# Patient Record
Sex: FEMALE | Race: WHITE | Employment: OTHER | URBAN - METROPOLITAN AREA
[De-identification: names, ages, dates, MRNs, and addresses within clinical notes are randomized per-mention and may not be internally consistent; named-entity substitution may affect disease eponyms.]

---

## 2019-04-29 ENCOUNTER — APPOINTMENT (OUTPATIENT)
Dept: GENERAL RADIOLOGY | Facility: HOSPITAL | Age: 74
End: 2019-04-29
Attending: EMERGENCY MEDICINE
Payer: MEDICARE

## 2019-04-29 ENCOUNTER — APPOINTMENT (OUTPATIENT)
Dept: CV DIAGNOSTICS | Facility: HOSPITAL | Age: 74
End: 2019-04-29
Attending: HOSPITALIST
Payer: MEDICARE

## 2019-04-29 ENCOUNTER — HOSPITAL ENCOUNTER (OUTPATIENT)
Facility: HOSPITAL | Age: 74
Setting detail: OBSERVATION
Discharge: HOME | End: 2019-04-30
Attending: EMERGENCY MEDICINE | Admitting: HOSPITALIST
Payer: MEDICARE

## 2019-04-29 ENCOUNTER — APPOINTMENT (OUTPATIENT)
Dept: CT IMAGING | Facility: HOSPITAL | Age: 74
End: 2019-04-29
Attending: EMERGENCY MEDICINE
Payer: MEDICARE

## 2019-04-29 DIAGNOSIS — R55 SYNCOPE AND COLLAPSE: Primary | ICD-10-CM

## 2019-04-29 PROCEDURE — 73030 X-RAY EXAM OF SHOULDER: CPT | Performed by: EMERGENCY MEDICINE

## 2019-04-29 PROCEDURE — 70450 CT HEAD/BRAIN W/O DYE: CPT | Performed by: EMERGENCY MEDICINE

## 2019-04-29 PROCEDURE — 93306 TTE W/DOPPLER COMPLETE: CPT | Performed by: HOSPITALIST

## 2019-04-29 PROCEDURE — 71045 X-RAY EXAM CHEST 1 VIEW: CPT | Performed by: EMERGENCY MEDICINE

## 2019-04-29 PROCEDURE — 99220 INITIAL OBSERVATION CARE,LEVL III: CPT | Performed by: HOSPITALIST

## 2019-04-29 RX ORDER — METOPROLOL TARTRATE 50 MG/1
50 TABLET, FILM COATED ORAL ONCE AS NEEDED
Status: ACTIVE | OUTPATIENT
Start: 2019-04-29 | End: 2019-04-29

## 2019-04-29 RX ORDER — ASPIRIN 325 MG
325 TABLET ORAL DAILY
Status: DISCONTINUED | OUTPATIENT
Start: 2019-04-29 | End: 2019-04-30

## 2019-04-29 RX ORDER — CLONAZEPAM 0.5 MG/1
0.5 TABLET ORAL NIGHTLY PRN
Status: DISCONTINUED | OUTPATIENT
Start: 2019-04-29 | End: 2019-04-30

## 2019-04-29 RX ORDER — METOPROLOL TARTRATE 50 MG/1
50 TABLET, FILM COATED ORAL ONCE
Status: DISCONTINUED | OUTPATIENT
Start: 2019-04-30 | End: 2019-04-30

## 2019-04-29 RX ORDER — METOPROLOL TARTRATE 100 MG/1
100 TABLET ORAL ONCE
Status: DISCONTINUED | OUTPATIENT
Start: 2019-04-30 | End: 2019-04-30

## 2019-04-29 RX ORDER — SODIUM CHLORIDE 0.9 % (FLUSH) 0.9 %
3 SYRINGE (ML) INJECTION AS NEEDED
Status: DISCONTINUED | OUTPATIENT
Start: 2019-04-29 | End: 2019-04-30

## 2019-04-29 RX ORDER — ATORVASTATIN CALCIUM 40 MG/1
80 TABLET, FILM COATED ORAL NIGHTLY
Status: DISCONTINUED | OUTPATIENT
Start: 2019-04-29 | End: 2019-04-30

## 2019-04-29 RX ORDER — DILTIAZEM HYDROCHLORIDE 5 MG/ML
10 INJECTION INTRAVENOUS SEE ADMIN INSTRUCTIONS
Status: DISCONTINUED | OUTPATIENT
Start: 2019-04-29 | End: 2019-04-30

## 2019-04-29 RX ORDER — METOPROLOL TARTRATE 100 MG/1
100 TABLET ORAL ONCE AS NEEDED
Status: DISCONTINUED | OUTPATIENT
Start: 2019-04-29 | End: 2019-04-30

## 2019-04-29 RX ORDER — CARVEDILOL 6.25 MG/1
6.25 TABLET ORAL 2 TIMES DAILY WITH MEALS
Status: DISCONTINUED | OUTPATIENT
Start: 2019-04-29 | End: 2019-04-30

## 2019-04-29 RX ORDER — METOPROLOL TARTRATE 50 MG/1
50 TABLET, FILM COATED ORAL ONCE
Status: COMPLETED | OUTPATIENT
Start: 2019-04-29 | End: 2019-04-29

## 2019-04-29 RX ORDER — METOPROLOL TARTRATE 100 MG/1
100 TABLET ORAL ONCE
Status: COMPLETED | OUTPATIENT
Start: 2019-04-29 | End: 2019-04-29

## 2019-04-29 RX ORDER — NITROGLYCERIN 0.4 MG/1
0.4 TABLET SUBLINGUAL ONCE
Status: DISCONTINUED | OUTPATIENT
Start: 2019-04-29 | End: 2019-04-30

## 2019-04-29 RX ORDER — METOPROLOL TARTRATE 50 MG/1
50 TABLET, FILM COATED ORAL ONCE AS NEEDED
Status: DISCONTINUED | OUTPATIENT
Start: 2019-04-30 | End: 2019-04-30

## 2019-04-29 RX ORDER — MIRTAZAPINE 15 MG/1
22.5 TABLET, FILM COATED ORAL NIGHTLY
Status: DISCONTINUED | OUTPATIENT
Start: 2019-04-29 | End: 2019-04-30

## 2019-04-29 RX ORDER — GLIMEPIRIDE 1 MG/1
0.5 TABLET ORAL
Status: DISCONTINUED | OUTPATIENT
Start: 2019-04-30 | End: 2019-04-30

## 2019-04-29 RX ORDER — ONDANSETRON 2 MG/ML
4 INJECTION INTRAMUSCULAR; INTRAVENOUS EVERY 6 HOURS PRN
Status: DISCONTINUED | OUTPATIENT
Start: 2019-04-29 | End: 2019-04-30

## 2019-04-29 RX ORDER — HEPARIN SODIUM 5000 [USP'U]/ML
5000 INJECTION, SOLUTION INTRAVENOUS; SUBCUTANEOUS EVERY 12 HOURS SCHEDULED
Status: DISCONTINUED | OUTPATIENT
Start: 2019-04-29 | End: 2019-04-30

## 2019-04-29 RX ORDER — ALPRAZOLAM 0.25 MG/1
0.25 TABLET ORAL ONCE AS NEEDED
Status: DISCONTINUED | OUTPATIENT
Start: 2019-04-30 | End: 2019-04-30

## 2019-04-29 RX ORDER — METOPROLOL TARTRATE 5 MG/5ML
5 INJECTION INTRAVENOUS SEE ADMIN INSTRUCTIONS
Status: DISCONTINUED | OUTPATIENT
Start: 2019-04-29 | End: 2019-04-30

## 2019-04-29 RX ORDER — NITROGLYCERIN 0.4 MG/1
0.4 TABLET SUBLINGUAL EVERY 5 MIN PRN
Status: DISCONTINUED | OUTPATIENT
Start: 2019-04-29 | End: 2019-04-30

## 2019-04-29 RX ORDER — METOPROLOL TARTRATE 100 MG/1
100 TABLET ORAL ONCE AS NEEDED
Status: ACTIVE | OUTPATIENT
Start: 2019-04-29 | End: 2019-04-29

## 2019-04-29 RX ORDER — DEXTROSE MONOHYDRATE 25 G/50ML
50 INJECTION, SOLUTION INTRAVENOUS AS NEEDED
Status: DISCONTINUED | OUTPATIENT
Start: 2019-04-29 | End: 2019-04-30

## 2019-04-29 RX ORDER — METOPROLOL TARTRATE 50 MG/1
50 TABLET, FILM COATED ORAL ONCE AS NEEDED
Status: DISCONTINUED | OUTPATIENT
Start: 2019-04-29 | End: 2019-04-30

## 2019-04-29 NOTE — ED INITIAL ASSESSMENT (HPI)
Pt c/o diarrhea since Friday and today c/o nausea and chills. Pt denies fever, no blood in stool. Pt states she also feel this am while getting out of the bathtub and hit her head. Pt states she does not remember how she fell.   C/o lump to right side

## 2019-04-29 NOTE — H&P
[facility]    PATIENT'S NAME: Lou Brittonbaironfrances PHYSICIAN: Ren Blake MD   PATIENT ACCOUNT#:   [id number]    LOCATION:  [address] Gallo [facility] RECORD #:   [id number]       YOB: 1945  ADMISSION DATE:       04/29/20 against the bathroom tile. She denies any bleed. No chest pain. Other 12-point review of systems negative. PHYSICAL EXAMINATION:    GENERAL:  Alert, oriented to time, place, and person, no acute distress.   VITAL SIGNS:  Temperature 97.6, pulse 78, Orthopedic Surgery upon discharge.     Dictated By Kaity Mosley MD  d: 04/29/2019 15:53:43  t: 04/29/2019 15:57:41  Job [id number]  /

## 2019-04-29 NOTE — ED PROVIDER NOTES
Patient Seen in: [facility] AND [facility] Emergency Department    History   Patient presents with:  Nausea/Vomiting/Diarrhea (gastrointestinal)  Fall (musculoskeletal, neurologic)    Stated Complaint: diarrhea, nausea, weakness    HPI    57-year-old female with Used    Alcohol use: No    Drug use: No      Review of Systems    Positive for stated complaint: diarrhea, nausea, weakness  Other systems are as noted in HPI. Constitutional and vital signs reviewed.       All other systems reviewed and negative except as Ketones Urine 80  (*)     Ascorbic Acid Urine 40  (*)     All other components within normal limits   BASIC METABOLIC PANEL (8) - Abnormal; Notable for the following components:    Glucose 180 (*)     BUN/CREA Ratio 22.5 (*)     All other components within pressure.     Medications Prescribed:  Current Discharge Medication List        Present on Admission           ICD-10-CM Noted POA    Syncope and collapse R55 10/24/2016 Unknown

## 2019-04-29 NOTE — CONSULTS
[facility]    Report of Consultation    Drake Roldan Patient Status:  Emergency    1945 MRN [id number]   Location [address] Attending Anjel Mccormick MD   Hosp Day # 0 PCP CATHIE ANN Rigoberto Leyden Oral, resp. rate 16, height 149.9 cm (4' 11\"), weight 100 lb (45.4 kg), SpO2 98 %. Intake/Output:   Last 3 shifts: No intake/output data recorded. This shift: No intake/output data recorded.      Vent Settings:      Hemodynamic parameters (last 24 hours aortic root was normal in size. Mitral valve:   Normal thickness leaflets. . Mobility was not restricted. Doppler:   There was no evidence for stenosis.    No regurgitation. Left atrium:  The atrium was normal in size.   Right ventricle:  The cavity size

## 2019-04-30 ENCOUNTER — APPOINTMENT (OUTPATIENT)
Dept: NUCLEAR MEDICINE | Facility: HOSPITAL | Age: 74
End: 2019-04-30
Attending: HOSPITALIST
Payer: MEDICARE

## 2019-04-30 ENCOUNTER — APPOINTMENT (OUTPATIENT)
Dept: CT IMAGING | Facility: HOSPITAL | Age: 74
End: 2019-04-30
Attending: HOSPITALIST
Payer: MEDICARE

## 2019-04-30 ENCOUNTER — APPOINTMENT (OUTPATIENT)
Dept: CV DIAGNOSTICS | Facility: HOSPITAL | Age: 74
End: 2019-04-30
Attending: HOSPITALIST
Payer: MEDICARE

## 2019-04-30 VITALS
SYSTOLIC BLOOD PRESSURE: 129 MMHG | WEIGHT: 86.88 LBS | OXYGEN SATURATION: 96 % | TEMPERATURE: 98 F | RESPIRATION RATE: 16 BRPM | DIASTOLIC BLOOD PRESSURE: 47 MMHG | BODY MASS INDEX: 17.52 KG/M2 | HEIGHT: 59 IN | HEART RATE: 55 BPM

## 2019-04-30 PROCEDURE — 93017 CV STRESS TEST TRACING ONLY: CPT | Performed by: HOSPITALIST

## 2019-04-30 PROCEDURE — 78452 HT MUSCLE IMAGE SPECT MULT: CPT | Performed by: HOSPITALIST

## 2019-04-30 PROCEDURE — 99217 OBSERVATION CARE DISCHARGE: CPT | Performed by: HOSPITALIST

## 2019-04-30 PROCEDURE — 75571 CT HRT W/O DYE W/CA TEST: CPT | Performed by: HOSPITALIST

## 2019-04-30 RX ORDER — AMLODIPINE BESYLATE 5 MG/1
5 TABLET ORAL DAILY
Qty: 30 TABLET | Refills: 0 | Status: SHIPPED | OUTPATIENT
Start: 2019-04-30

## 2019-04-30 RX ORDER — ATORVASTATIN CALCIUM 20 MG/1
20 TABLET, FILM COATED ORAL NIGHTLY
Qty: 30 TABLET | Refills: 0 | Status: SHIPPED | OUTPATIENT
Start: 2019-04-30 | End: 2021-10-07

## 2019-04-30 RX ORDER — 0.9 % SODIUM CHLORIDE 0.9 %
VIAL (ML) INJECTION
Status: COMPLETED
Start: 2019-04-30 | End: 2019-04-30

## 2019-04-30 RX ORDER — NITROGLYCERIN 0.4 MG/1
TABLET SUBLINGUAL
Status: COMPLETED
Start: 2019-04-30 | End: 2019-04-30

## 2019-04-30 NOTE — PROGRESS NOTES
Cardiology progress note             Current Medications:    Current Facility-Administered Medications:  Normal Saline Flush 0.9 % injection 3 mL 3 mL Intravenous PRN   ondansetron HCl (ZOFRAN) injection 4 mg 4 mg Intravenous Q6H PRN   Heparin Sodium (Porc 22.5 mg 22.5 mg Oral Nightly   linagliptin (TRADJENTA) tab TABS 5 mg 5 mg Oral Daily       Medications Prior to Admission:  SITagliptin Phosphate 50 MG Oral Tab Take 1 tablet (50 mg total) by mouth daily.    carvedilol 6.25 MG Oral Tab Take 1 tablet (6.25 m Component Value Date    WBC 5.5 04/30/2019    HGB 12.7 04/30/2019    HCT 36.6 04/30/2019    .0 04/30/2019    CREATSERUM 0.75 04/30/2019    BUN 27 (H) 04/30/2019     04/30/2019    K 4.3 04/30/2019     04/30/2019    CO2 29.0 04/30/2019 (CST): Raven Adamson MD on 4/30/2019 at 9:33             Echo 4/29/19  Left ventricle:  The cavity size was normal. There was no hypertrophy. Systolic  function was normal. The estimated ejection fraction was 70%.  Wall motion was  normal; there were no r [id number]  Tel: [phone]

## 2019-04-30 NOTE — BH PROGRESS NOTE
Behavioral Health Note  CHIEF COMPLAINT  Syncope    REASON FOR ADMISSION  Syncope    SOCIAL HISTORY  Latoya lives at [facility] and is . She does not smoke, drink alcohol or use drugs.     PAST PSYCHIATRIC HISTORY  Laverne Linton has a h/o depression patient has a dx r/o depressive disorder. PLAN  1. Patient declines referrals.    Jamie Sandoval LCSW

## 2019-04-30 NOTE — DISCHARGE PLANNING
Patient and son Donnell Kim (with permission) were provided with discharge instructions and education including how to avoid syncope/identify signs of syncope and clavicle fracture.  Patient's prescriptions were sent electronically to her outpatient pharmacy and h

## 2019-04-30 NOTE — IMAGING NOTE
TO CT AT 0825  HX TAKEN PT CONSENTED AT 4/29/19 @ 0822 VS HR 55 /88    18 GAUGE IV STARTED AT 0546 POC TESTING COMPLETED GFR > 60  CREATINE = 0.75    CTA ORDERED BY DR Dana Sow WAS PT GIVEN CTA  PREMEDS  MG @ 8875 4/29/19    Syncopal episode w

## 2019-04-30 NOTE — PLAN OF CARE
Problem: Patient/Family Goals  Goal: Patient/Family Long Term Goal  Description  Patient's Long Term Goal: \"Stay healthy so I stay out of the hospital. I haven't taken any medications for two years\"    Interventions:  - Follow up with MDs as recommende Assess pain using appropriate pain scale  - Administer analgesics based on type and severity of pain and evaluate response  - Implement non-pharmacological measures as appropriate and evaluate response  - Consider cultural and social influences on pain and

## 2019-04-30 NOTE — DISCHARGE SUMMARY
[facility] Hospitalist Discharge Summary   Patient ID:  Joselyn Leisure  [id number]  66year old  1/9/1945    Admit date: 4/29/2019  Discharge date: 4/30/2019  Primary Care Physician: [facility] Lachelle Flor [address] [place] [address]   Attending Physician: Alfredo Tamayo MD ND  EXTREMITIES: no edema, no calf tenderness    Operative Procedures:   Radiology:   Xr Shoulder, Complete (min 2 Views), Right (cpt=73030)    Result Date: 4/29/2019  CONCLUSION:  1. Acute nondisplaced distal right clavicle fracture deformity.  2. Right sh total) by mouth nightly. What changed:    · medication strength  · how much to take        CONTINUE taking these medications    aspirin 81 MG Tabs        STOP taking these medications    carvedilol 6.25 MG Tabs  Commonly known as:  COREG     clonazePAM 0. MD  Hospitalist  4/30/2019

## 2019-08-31 ENCOUNTER — APPOINTMENT (OUTPATIENT)
Dept: LAB | Age: 74
End: 2019-08-31
Attending: INTERNAL MEDICINE
Payer: MEDICARE

## 2019-08-31 ENCOUNTER — LAB ENCOUNTER (OUTPATIENT)
Dept: LAB | Facility: HOSPITAL | Age: 74
End: 2019-08-31
Attending: INTERNAL MEDICINE
Payer: MEDICARE

## 2019-08-31 DIAGNOSIS — I25.10 CORONARY ATHEROSCLEROSIS OF NATIVE CORONARY ARTERY: Primary | ICD-10-CM

## 2019-08-31 LAB
CHOLEST SMN-MCNC: 199 MG/DL (ref ?–200)
HDLC SERPL-MCNC: 86 MG/DL (ref 40–59)
LDLC SERPL CALC-MCNC: 95 MG/DL (ref ?–100)
NONHDLC SERPL-MCNC: 113 MG/DL (ref ?–130)
PATIENT FASTING: YES
TRIGL SERPL-MCNC: 91 MG/DL (ref 30–149)
VLDLC SERPL CALC-MCNC: 18 MG/DL (ref 0–30)

## 2019-08-31 PROCEDURE — 36415 COLL VENOUS BLD VENIPUNCTURE: CPT

## 2019-08-31 PROCEDURE — 80061 LIPID PANEL: CPT

## 2019-09-30 NOTE — PLAN OF CARE
Problem: MUSCULOSKELETAL - ADULT  Goal: Maintain proper alignment of affected body part  Description  INTERVENTIONS:  - Support and protect limb and body alignment per provider's orders  - Instruct and reinforce with patient and family use of appropriate assistive devices as appropriate  - Consider OT/PT consult to assist with strengthening/mobility  - Encourage toileting schedule  Outcome: Progressing     Problem: CARDIOVASCULAR - ADULT  Goal: Maintains optimal cardiac output and hemodynamic stability  Anton Hdz 2019

## 2020-02-29 ENCOUNTER — LAB ENCOUNTER (OUTPATIENT)
Dept: LAB | Age: 75
End: 2020-02-29
Attending: INTERNAL MEDICINE
Payer: MEDICARE

## 2020-02-29 DIAGNOSIS — I25.10 CORONARY ATHEROSCLEROSIS OF NATIVE CORONARY ARTERY: Primary | ICD-10-CM

## 2020-02-29 LAB
CHOLEST SMN-MCNC: 261 MG/DL (ref ?–200)
HDLC SERPL-MCNC: 82 MG/DL (ref 40–59)
LDLC SERPL CALC-MCNC: 159 MG/DL (ref ?–100)
NONHDLC SERPL-MCNC: 179 MG/DL (ref ?–130)
PATIENT FASTING Y/N/NP: YES
TRIGL SERPL-MCNC: 102 MG/DL (ref 30–149)
VLDLC SERPL CALC-MCNC: 20 MG/DL (ref 0–30)

## 2020-02-29 PROCEDURE — 80061 LIPID PANEL: CPT

## 2020-02-29 PROCEDURE — 36415 COLL VENOUS BLD VENIPUNCTURE: CPT

## 2021-10-04 ENCOUNTER — HOSPITAL ENCOUNTER (OUTPATIENT)
Dept: ULTRASOUND IMAGING | Facility: HOSPITAL | Age: 76
Discharge: HOME | End: 2021-10-04
Attending: INTERNAL MEDICINE
Payer: MEDICARE

## 2021-10-04 DIAGNOSIS — E04.2 MULTIPLE THYROID NODULES: ICD-10-CM

## 2021-10-04 PROCEDURE — 76536 US EXAM OF HEAD AND NECK: CPT | Performed by: INTERNAL MEDICINE

## 2021-10-05 PROBLEM — I25.10 CORONARY ARTERY DISEASE: Status: ACTIVE | Noted: 2021-10-05

## 2021-10-05 PROBLEM — I65.29 CAROTID ARTERY STENOSIS: Status: ACTIVE | Noted: 2021-10-05

## 2021-10-05 PROBLEM — I10 HYPERTENSION: Status: ACTIVE | Noted: 2019-05-07

## 2021-10-05 PROBLEM — M85.80 OSTEOPENIA: Status: ACTIVE | Noted: 2021-10-05

## 2021-10-05 PROBLEM — K21.9 GERD (GASTROESOPHAGEAL REFLUX DISEASE): Status: ACTIVE | Noted: 2021-10-05

## 2021-10-05 PROBLEM — E78.5 DYSLIPIDEMIA: Status: ACTIVE | Noted: 2019-05-07

## 2021-10-05 PROBLEM — E78.5 HYPERLIPIDEMIA: Status: ACTIVE | Noted: 2019-05-07

## 2021-10-05 NOTE — PROGRESS NOTES
[facility] NOTE    HPI  Bjiu Montalvo is a 64year old female presenting for     #Abnormal thyroid ultrasound  #Multinodular thyroid  -US thyroid 10/4/21 - Heterogeneous thyroid consistent with diffuse thyroid disease.  Multinodular thyroid ALLERGIES    Ciprofloxacin               MEDICATIONS  Current Outpatient Medications   Medication Sig Dispense Refill   • rosuvastatin 40 MG Oral Tab Take 40 mg by mouth daily.      • amLODIPine Besylate 5 MG Oral Tab Take 1 tablet (5 mg total) by adrian Occupational History      Not on file    Tobacco Use      Smoking status: Never Smoker      Smokeless tobacco: Never Used    Substance and Sexual Activity      Alcohol use: No      Drug use: No      Sexual activity: Not on file    Other Topics      Concern REMOVAL OF OVARIAN CYST(S)         PAST FAMILY HISTORY  No family history on file. PHYSICAL EXAM   10/07/21  1535   BP: 146/62   Pulse: 93   SpO2: 97%   Weight: 92 lb (41.7 kg)   Height: 4' 11\" (1.499 m)      Body mass index is 18.58 kg/m².     GENERA well-controlled on amlodipine 5 mg daily. Typically follows with cardiology as well. Type 2 diabetes mellitus with diabetic peripheral angiopathy without gangrene [facility])     Patient with history of diabetes, now currently diet controlled.   I will c

## 2021-10-07 ENCOUNTER — OFFICE VISIT (OUTPATIENT)
Dept: INTERNAL MEDICINE CLINIC | Facility: CLINIC | Age: 76
End: 2021-10-07
Payer: MEDICARE

## 2021-10-07 VITALS
WEIGHT: 92 LBS | SYSTOLIC BLOOD PRESSURE: 146 MMHG | HEIGHT: 59 IN | BODY MASS INDEX: 18.55 KG/M2 | HEART RATE: 93 BPM | OXYGEN SATURATION: 97 % | DIASTOLIC BLOOD PRESSURE: 62 MMHG

## 2021-10-07 DIAGNOSIS — E04.2 MULTINODULAR THYROID: Primary | ICD-10-CM

## 2021-10-07 DIAGNOSIS — Z00.00 HEALTH MAINTENANCE EXAMINATION: ICD-10-CM

## 2021-10-07 DIAGNOSIS — I65.29 STENOSIS OF CAROTID ARTERY, UNSPECIFIED LATERALITY: ICD-10-CM

## 2021-10-07 DIAGNOSIS — I10 HYPERTENSION, UNSPECIFIED TYPE: ICD-10-CM

## 2021-10-07 DIAGNOSIS — E11.51 TYPE 2 DIABETES MELLITUS WITH DIABETIC PERIPHERAL ANGIOPATHY WITHOUT GANGRENE, UNSPECIFIED WHETHER LONG TERM INSULIN USE (HCC): ICD-10-CM

## 2021-10-07 DIAGNOSIS — E78.5 DYSLIPIDEMIA: ICD-10-CM

## 2021-10-07 PROCEDURE — 83036 HEMOGLOBIN GLYCOSYLATED A1C: CPT | Performed by: FAMILY MEDICINE

## 2021-10-07 PROCEDURE — 86803 HEPATITIS C AB TEST: CPT | Performed by: FAMILY MEDICINE

## 2021-10-07 PROCEDURE — 84443 ASSAY THYROID STIM HORMONE: CPT | Performed by: FAMILY MEDICINE

## 2021-10-07 PROCEDURE — 82043 UR ALBUMIN QUANTITATIVE: CPT | Performed by: FAMILY MEDICINE

## 2021-10-07 PROCEDURE — 80053 COMPREHEN METABOLIC PANEL: CPT | Performed by: FAMILY MEDICINE

## 2021-10-07 PROCEDURE — 82570 ASSAY OF URINE CREATININE: CPT | Performed by: FAMILY MEDICINE

## 2021-10-07 PROCEDURE — 85025 COMPLETE CBC W/AUTO DIFF WBC: CPT | Performed by: FAMILY MEDICINE

## 2021-10-07 PROCEDURE — 80061 LIPID PANEL: CPT | Performed by: FAMILY MEDICINE

## 2021-10-07 PROCEDURE — 99204 OFFICE O/P NEW MOD 45 MIN: CPT | Performed by: FAMILY MEDICINE

## 2021-10-07 RX ORDER — ROSUVASTATIN CALCIUM 40 MG/1
40 TABLET, COATED ORAL DAILY
COMMUNITY
Start: 2021-05-24

## 2021-10-07 NOTE — PATIENT INSTRUCTIONS
PATIENT INSTRUCTIONS    • Thank you for seeing me today, it was a pleasure taking care of you. • Please check out at the  and schedule a follow up appointment. Return in about 1 week (around 10/14/2021) for medicare wellness visit.   • Please ge 12/1/2019  © 2015-6288 [employer]. All rights reserved. This information is not intended as a substitute for professional medical care. Always follow your healthcare professional's instructions.

## 2021-10-07 NOTE — ASSESSMENT & PLAN NOTE
Patient with multinodular thyroid seen on ultrasound ordered by cardiology. Patient denies any symptoms of hypothyroidism or hyperthyroidism at this time. I will check a TSH. I will refer patient to endocrinology for further evaluation.

## 2021-10-07 NOTE — ASSESSMENT & PLAN NOTE
Patient with history of diabetes, now currently diet controlled. I will check hemoglobin A1c. I will also check microalbumin. Patient declined pneumonia vaccine. Would benefit from diabetic foot exam on next visit.

## 2021-10-20 ENCOUNTER — OFFICE VISIT (OUTPATIENT)
Dept: INTERNAL MEDICINE CLINIC | Facility: CLINIC | Age: 76
End: 2021-10-20
Payer: MEDICARE

## 2021-10-20 VITALS
DIASTOLIC BLOOD PRESSURE: 64 MMHG | BODY MASS INDEX: 17.7 KG/M2 | HEIGHT: 59 IN | SYSTOLIC BLOOD PRESSURE: 136 MMHG | WEIGHT: 87.81 LBS | OXYGEN SATURATION: 98 % | HEART RATE: 48 BPM

## 2021-10-20 DIAGNOSIS — I10 HYPERTENSION, UNSPECIFIED TYPE: ICD-10-CM

## 2021-10-20 DIAGNOSIS — E78.5 DYSLIPIDEMIA: ICD-10-CM

## 2021-10-20 DIAGNOSIS — R63.6 UNDERWEIGHT: ICD-10-CM

## 2021-10-20 DIAGNOSIS — Z00.00 ENCOUNTER FOR ANNUAL HEALTH EXAMINATION: ICD-10-CM

## 2021-10-20 DIAGNOSIS — E11.51 TYPE 2 DIABETES MELLITUS WITH DIABETIC PERIPHERAL ANGIOPATHY WITHOUT GANGRENE, WITHOUT LONG-TERM CURRENT USE OF INSULIN (HCC): ICD-10-CM

## 2021-10-20 DIAGNOSIS — M85.80 OSTEOPENIA, UNSPECIFIED LOCATION: ICD-10-CM

## 2021-10-20 DIAGNOSIS — Z00.00 HEALTH MAINTENANCE EXAMINATION: Primary | ICD-10-CM

## 2021-10-20 PROBLEM — R55 SYNCOPE: Status: RESOLVED | Noted: 2019-04-29 | Resolved: 2021-10-20

## 2021-10-20 PROCEDURE — 3075F SYST BP GE 130 - 139MM HG: CPT | Performed by: FAMILY MEDICINE

## 2021-10-20 PROCEDURE — G0439 PPPS, SUBSEQ VISIT: HCPCS | Performed by: FAMILY MEDICINE

## 2021-10-20 PROCEDURE — 3078F DIAST BP <80 MM HG: CPT | Performed by: FAMILY MEDICINE

## 2021-10-20 PROCEDURE — 3008F BODY MASS INDEX DOCD: CPT | Performed by: FAMILY MEDICINE

## 2021-10-20 PROCEDURE — G0444 DEPRESSION SCREEN ANNUAL: HCPCS | Performed by: FAMILY MEDICINE

## 2021-10-20 NOTE — ASSESSMENT & PLAN NOTE
Patient and son report that she lost significant weight after her  passed away in 2015. Her weights have since been stable. We will continue to monitor.

## 2021-10-20 NOTE — PATIENT INSTRUCTIONS
PATIENT INSTRUCTIONS    • Thank you for seeing me today, it was a pleasure taking care of you. • Please check out at the  and schedule a follow up appointment. Return in about 6 months (around 4/20/2022) for follow up.   • Please get your labs d with a family history -     Colorectal Cancer Screening  Covered for ages 52-80; only need ONE of the following:    Colonoscopy   Covered every 10 years    Covered every 2 years if patient is at high risk or previous colonoscopy was abnormal -    No recomm retest more frequently.     Medicare covers every 3 months Lab Results   Component Value Date     10/07/2021    A1C 5.6 10/07/2021       No recommendations at this time    Creat/alb ratio Annually Lab Results   Component Value Date    MICROALBCREA 80

## 2021-10-20 NOTE — ASSESSMENT & PLAN NOTE
Exercise and diet advised. Tdap declined   Flu shot declined  Shingles vaccine declined  Advanced directive information - please bring into clinic. In general, prefers less aggressive and less invasive measures to take care of her health.   Declines any f

## 2021-10-20 NOTE — PROGRESS NOTES
FAMILY MEDICINE CLINIC NOTE - MEDICARE    HPI  Earnest Meier is a 64year old female presenting for a medicare visit    #Health Maintenance  -Diet: Fruits, vegetables. Lost a lot of weight after her  passed away.    -Exercise: Walking and stretc or Showering: Able without help    Toileting: Able without help    Dressing: Able without help    Eating: Able without help    Driving: Does not drive    Preparing your meals: Need some help    Managing money/bills: Able without help    Taking medications OTHER (SEE COMMENTS)    Comment:Passed out  Sulfamethoxazole W/*    OTHER (SEE COMMENTS)    Comment:Stomach upset    MEDICATIONS  Current Outpatient Medications   Medication Sig Dispense Refill   • rosuvastatin 40 MG Oral Tab Take 40 mg by mouth rylan file    Tobacco Use      Smoking status: Never Smoker      Smokeless tobacco: Never Used    Vaping Use      Vaping Use: Never used    Substance and Sexual Activity      Alcohol use: No      Drug use: No      Sexual activity: Not Currently    Other Topics Abused: Not on file  Housing Stability:       Unable to Pay for Housing in the Last Year: Not on file      Number of Places Lived in the Last Year: Not on file      Unstable Housing in the Last Year: Not on file    PAST SURGICAL HISTORY  Past Surgical Hist 10/07/2021    TSH 0.667 10/07/2021    CREATSERUM 0.58 10/07/2021     (H) 10/07/2021       Lab Results   Component Value Date    CHOLEST 262 (H) 10/07/2021    HDL 78 (H) 10/07/2021     (H) 10/07/2021    TRIG 102 10/07/2021         DIAGNOSTICS Fasting Blood Sugar /  Glucose    One screening every 12 months if never tested or if previously tested but not diagnosed with pre-diabetes   One screening every 6 months if diagnosed with pre-diabetes    Cardiovascular Disease Screening    Lipid Panel  Ch pharmacy prescription benefits     Tetanus, Diptheria and Pertusis TD and TDaP Not covered by Medicare Part B     Zoster Not covered by Medicare Part B; may be covered with your pharmacy  prescription benefits

## 2021-10-20 NOTE — ASSESSMENT & PLAN NOTE
Discuss with cardiology regarding cholesterol management. Currently on rosuvastatin and values remain high.

## 2021-12-01 ENCOUNTER — OFFICE VISIT (OUTPATIENT)
Dept: ENDOCRINOLOGY CLINIC | Facility: CLINIC | Age: 76
End: 2021-12-01
Payer: MEDICARE

## 2021-12-01 VITALS
WEIGHT: 89 LBS | BODY MASS INDEX: 18 KG/M2 | SYSTOLIC BLOOD PRESSURE: 130 MMHG | HEART RATE: 99 BPM | DIASTOLIC BLOOD PRESSURE: 68 MMHG

## 2021-12-01 DIAGNOSIS — E04.2 MULTINODULAR GOITER (NONTOXIC): Primary | ICD-10-CM

## 2021-12-01 PROCEDURE — 99204 OFFICE O/P NEW MOD 45 MIN: CPT | Performed by: INTERNAL MEDICINE

## 2021-12-01 NOTE — H&P
Name: Beni Brito  Date: 12/1/2021    Referring Physician: Mauricio Xavier    Patient presents with:  Consult: Pt is here for to establish care with an endocrinologist, Pt would like to have a consult on her thyroid.  Pt states she had an ultrasound d • CATARACT     • CHOLECYSTECTOMY     • COLONOSCOPY      POLYPS REMOVED   • DILATION/CURETTAGE,DIAGNOSTIC     • HYSTERECTOMY     • LAMINECTOMY     • REMOVAL OF KIDNEY STONE     • REMOVAL OF OVARIAN CYST(S)         Family History:  Family History   Proble Alert, in no acute distress, well developed  Eyes: normal conjunctivae, sclera.   Ears/Nose/Mouth/Throat/Neck:  normal hearing, normal speech and normal thyroid gland  Neck: Trachea midline  Neurologic: sensory grossly intact and motor grossly intact  Respi Midpole measuring 1.8 x 1.3 x 1.2 cm   Composition: Solid or almost completely solid (2)     Echogenicity:   Hyperechoic or isoechoic (1)   Shape:   Wider-than-tall (0)     Margin:   Smooth (0)     Echogenic Foci:   None or large comet tail artifacts (0)   discovered during carotid US. We discussed that thyroid nodules have a common occurrence in the population of approximately 50-60%. Of these, the risk of malignancy is approx 3-5%, and 95-97% of nodules are benign.  We discussed that based upon appearance,

## 2022-04-28 ENCOUNTER — OFFICE VISIT (OUTPATIENT)
Dept: INTERNAL MEDICINE CLINIC | Facility: CLINIC | Age: 77
End: 2022-04-28
Payer: MEDICARE

## 2022-04-28 VITALS
SYSTOLIC BLOOD PRESSURE: 134 MMHG | OXYGEN SATURATION: 97 % | DIASTOLIC BLOOD PRESSURE: 46 MMHG | HEIGHT: 59 IN | WEIGHT: 87.81 LBS | HEART RATE: 52 BPM | BODY MASS INDEX: 17.7 KG/M2

## 2022-04-28 DIAGNOSIS — I65.29 STENOSIS OF CAROTID ARTERY, UNSPECIFIED LATERALITY: ICD-10-CM

## 2022-04-28 DIAGNOSIS — E11.51 TYPE 2 DIABETES MELLITUS WITH DIABETIC PERIPHERAL ANGIOPATHY WITHOUT GANGRENE, WITHOUT LONG-TERM CURRENT USE OF INSULIN (HCC): ICD-10-CM

## 2022-04-28 DIAGNOSIS — I25.10 CORONARY ARTERY DISEASE, UNSPECIFIED VESSEL OR LESION TYPE, UNSPECIFIED WHETHER ANGINA PRESENT, UNSPECIFIED WHETHER NATIVE OR TRANSPLANTED HEART: ICD-10-CM

## 2022-04-28 DIAGNOSIS — E78.00 PURE HYPERCHOLESTEROLEMIA: ICD-10-CM

## 2022-04-28 DIAGNOSIS — R63.6 UNDERWEIGHT: ICD-10-CM

## 2022-04-28 DIAGNOSIS — I10 HYPERTENSION, UNSPECIFIED TYPE: Primary | ICD-10-CM

## 2022-04-28 DIAGNOSIS — K21.9 GASTROESOPHAGEAL REFLUX DISEASE, UNSPECIFIED WHETHER ESOPHAGITIS PRESENT: ICD-10-CM

## 2022-04-28 DIAGNOSIS — E04.2 MULTINODULAR THYROID: ICD-10-CM

## 2022-04-28 LAB
CARTRIDGE LOT#: 889 NUMERIC
HEMOGLOBIN A1C: 5.6 % (ref 4.3–5.6)

## 2022-04-28 PROCEDURE — 99213 OFFICE O/P EST LOW 20 MIN: CPT | Performed by: FAMILY MEDICINE

## 2022-04-28 PROCEDURE — 83036 HEMOGLOBIN GLYCOSYLATED A1C: CPT | Performed by: FAMILY MEDICINE

## 2022-04-28 NOTE — ASSESSMENT & PLAN NOTE
Check POC a1c today for routine monitoring. A1c remains under very good control 5.6 - no need for medication. Advised eye examination - need to fax report over.

## 2022-04-28 NOTE — ASSESSMENT & PLAN NOTE
In care everywhere, lipid panel checked 3/2022. On rosuvastatin 40 mg at this time. Has plans to follow with cardiology to further evaluate.

## 2022-08-20 ENCOUNTER — LAB ENCOUNTER (OUTPATIENT)
Dept: LAB | Facility: HOSPITAL | Age: 77
End: 2022-08-20
Attending: INTERNAL MEDICINE
Payer: MEDICARE

## 2022-08-20 DIAGNOSIS — E78.5 HYPERLIPEMIA: Primary | ICD-10-CM

## 2022-08-20 DIAGNOSIS — E04.2 MULTINODULAR GOITER (NONTOXIC): ICD-10-CM

## 2022-08-20 LAB
CHOLEST SERPL-MCNC: 243 MG/DL (ref ?–200)
FASTING PATIENT LIPID ANSWER: YES
HDLC SERPL-MCNC: 69 MG/DL (ref 40–59)
LDLC SERPL CALC-MCNC: 158 MG/DL (ref ?–100)
NONHDLC SERPL-MCNC: 174 MG/DL (ref ?–130)
T4 FREE SERPL-MCNC: 1.2 NG/DL (ref 0.8–1.7)
TRIGL SERPL-MCNC: 94 MG/DL (ref 30–149)
TSI SER-ACNC: 0.46 MIU/ML (ref 0.36–3.74)
VLDLC SERPL CALC-MCNC: 18 MG/DL (ref 0–30)

## 2022-08-20 PROCEDURE — 80061 LIPID PANEL: CPT

## 2022-08-20 PROCEDURE — 36415 COLL VENOUS BLD VENIPUNCTURE: CPT

## 2022-08-20 PROCEDURE — 84439 ASSAY OF FREE THYROXINE: CPT

## 2022-08-20 PROCEDURE — 84443 ASSAY THYROID STIM HORMONE: CPT

## 2022-11-03 ENCOUNTER — OFFICE VISIT (OUTPATIENT)
Dept: INTERNAL MEDICINE CLINIC | Facility: CLINIC | Age: 77
End: 2022-11-03
Payer: MEDICARE

## 2022-11-03 VITALS
SYSTOLIC BLOOD PRESSURE: 144 MMHG | BODY MASS INDEX: 17.54 KG/M2 | RESPIRATION RATE: 16 BRPM | HEIGHT: 59 IN | TEMPERATURE: 98 F | DIASTOLIC BLOOD PRESSURE: 60 MMHG | WEIGHT: 87 LBS | OXYGEN SATURATION: 98 % | HEART RATE: 91 BPM

## 2022-11-03 DIAGNOSIS — E11.51 TYPE 2 DIABETES MELLITUS WITH DIABETIC PERIPHERAL ANGIOPATHY WITHOUT GANGRENE, WITHOUT LONG-TERM CURRENT USE OF INSULIN (HCC): Primary | ICD-10-CM

## 2022-11-03 DIAGNOSIS — I65.29 STENOSIS OF CAROTID ARTERY, UNSPECIFIED LATERALITY: ICD-10-CM

## 2022-11-03 DIAGNOSIS — M85.80 OSTEOPENIA, UNSPECIFIED LOCATION: ICD-10-CM

## 2022-11-03 DIAGNOSIS — I10 HYPERTENSION, UNSPECIFIED TYPE: ICD-10-CM

## 2022-11-03 DIAGNOSIS — I25.10 CORONARY ARTERY DISEASE, UNSPECIFIED VESSEL OR LESION TYPE, UNSPECIFIED WHETHER ANGINA PRESENT, UNSPECIFIED WHETHER NATIVE OR TRANSPLANTED HEART: ICD-10-CM

## 2022-11-03 DIAGNOSIS — Z00.00 ENCOUNTER FOR ANNUAL HEALTH EXAMINATION: ICD-10-CM

## 2022-11-03 DIAGNOSIS — E78.00 PURE HYPERCHOLESTEROLEMIA: ICD-10-CM

## 2022-11-03 DIAGNOSIS — I49.9 CARDIAC ARRHYTHMIA, UNSPECIFIED CARDIAC ARRHYTHMIA TYPE: ICD-10-CM

## 2022-11-03 DIAGNOSIS — K21.9 GASTROESOPHAGEAL REFLUX DISEASE, UNSPECIFIED WHETHER ESOPHAGITIS PRESENT: ICD-10-CM

## 2022-11-03 DIAGNOSIS — I44.1 SECOND DEGREE AV BLOCK: ICD-10-CM

## 2022-11-03 DIAGNOSIS — E04.2 MULTINODULAR THYROID: ICD-10-CM

## 2022-11-03 LAB
CARTRIDGE LOT#: NORMAL NUMERIC
CREAT UR-SCNC: 160 MG/DL
HEMOGLOBIN A1C: 5.4 % (ref 4.3–5.6)
MICROALBUMIN UR-MCNC: 6.25 MG/DL
MICROALBUMIN/CREAT 24H UR-RTO: 39.1 UG/MG (ref ?–30)

## 2022-11-03 PROCEDURE — 82043 UR ALBUMIN QUANTITATIVE: CPT | Performed by: FAMILY MEDICINE

## 2022-11-03 PROCEDURE — 82570 ASSAY OF URINE CREATININE: CPT | Performed by: FAMILY MEDICINE

## 2022-11-03 RX ORDER — EZETIMIBE 10 MG/1
TABLET ORAL
COMMUNITY

## 2022-11-03 RX ORDER — ROSUVASTATIN AND EZETIMIBE 10; 10.4 MG/1; MG/1
TABLET ORAL
COMMUNITY
Start: 2022-09-17 | End: 2022-11-03

## 2022-11-03 NOTE — ASSESSMENT & PLAN NOTE
Blood pressure slightly elevated in the office today.   Continue amlodipine 5 mg daily for now for  Advise given changes in EKG to see cardiology who may adjust blood pressure medications

## 2022-11-03 NOTE — ASSESSMENT & PLAN NOTE
Exercise and diet advised. Tdap declined   Flu shot declined  Shingles vaccine declined  Advanced directive information - please bring into clinic. In general, prefers less aggressive and less invasive measures to take care of her health. Declines any further colonoscopies. Declines any further mammograms. Declines any further DEXA scans.

## 2022-11-03 NOTE — ASSESSMENT & PLAN NOTE
Patient with a history of first-degree AV block  On auscultation today patient with dropped heartbeats. EKG obtained in the office- sinus rhythm, rate of 62, secondary AV block. She is hemodynamically stable at this time and otherwise feels well. She has a cardiologist-advised making an appointment to see him right away. ER precautions discussed with patient and son. Follow-up as needed.

## 2022-11-04 ENCOUNTER — TELEPHONE (OUTPATIENT)
Dept: INTERNAL MEDICINE CLINIC | Facility: CLINIC | Age: 77
End: 2022-11-04

## 2022-12-01 ENCOUNTER — HOSPITAL ENCOUNTER (OUTPATIENT)
Dept: ULTRASOUND IMAGING | Facility: HOSPITAL | Age: 77
Discharge: HOME | End: 2022-12-01
Attending: INTERNAL MEDICINE
Payer: MEDICARE

## 2022-12-01 DIAGNOSIS — E04.2 MULTINODULAR GOITER (NONTOXIC): ICD-10-CM

## 2022-12-01 PROCEDURE — 76536 US EXAM OF HEAD AND NECK: CPT | Performed by: INTERNAL MEDICINE

## 2023-05-04 ENCOUNTER — OFFICE VISIT (OUTPATIENT)
Dept: INTERNAL MEDICINE CLINIC | Facility: CLINIC | Age: 78
End: 2023-05-04
Payer: MEDICARE

## 2023-05-04 VITALS
BODY MASS INDEX: 18.35 KG/M2 | OXYGEN SATURATION: 99 % | RESPIRATION RATE: 16 BRPM | HEART RATE: 94 BPM | HEIGHT: 59 IN | SYSTOLIC BLOOD PRESSURE: 138 MMHG | DIASTOLIC BLOOD PRESSURE: 64 MMHG | WEIGHT: 91 LBS

## 2023-05-04 DIAGNOSIS — E04.2 MULTINODULAR THYROID: ICD-10-CM

## 2023-05-04 DIAGNOSIS — K21.9 GASTROESOPHAGEAL REFLUX DISEASE, UNSPECIFIED WHETHER ESOPHAGITIS PRESENT: ICD-10-CM

## 2023-05-04 DIAGNOSIS — Z00.00 HEALTH MAINTENANCE EXAMINATION: ICD-10-CM

## 2023-05-04 DIAGNOSIS — E78.00 PURE HYPERCHOLESTEROLEMIA: ICD-10-CM

## 2023-05-04 DIAGNOSIS — I65.29 STENOSIS OF CAROTID ARTERY, UNSPECIFIED LATERALITY: ICD-10-CM

## 2023-05-04 DIAGNOSIS — I10 HYPERTENSION, UNSPECIFIED TYPE: ICD-10-CM

## 2023-05-04 DIAGNOSIS — R63.6 UNDERWEIGHT: ICD-10-CM

## 2023-05-04 DIAGNOSIS — I25.10 CORONARY ARTERY DISEASE, UNSPECIFIED VESSEL OR LESION TYPE, UNSPECIFIED WHETHER ANGINA PRESENT, UNSPECIFIED WHETHER NATIVE OR TRANSPLANTED HEART: ICD-10-CM

## 2023-05-04 DIAGNOSIS — I44.1 SECOND DEGREE AV BLOCK: ICD-10-CM

## 2023-05-04 DIAGNOSIS — E11.51 TYPE 2 DIABETES MELLITUS WITH DIABETIC PERIPHERAL ANGIOPATHY WITHOUT GANGRENE, WITHOUT LONG-TERM CURRENT USE OF INSULIN (HCC): Primary | ICD-10-CM

## 2023-05-04 LAB
ALBUMIN SERPL-MCNC: 3.9 G/DL (ref 3.4–5)
ALBUMIN/GLOB SERPL: 1 {RATIO} (ref 1–2)
ALP LIVER SERPL-CCNC: 89 U/L
ALT SERPL-CCNC: 30 U/L
ANION GAP SERPL CALC-SCNC: 9 MMOL/L (ref 0–18)
AST SERPL-CCNC: 29 U/L (ref 15–37)
BILIRUB SERPL-MCNC: 0.4 MG/DL (ref 0.1–2)
BUN BLD-MCNC: 25 MG/DL (ref 7–18)
BUN/CREAT SERPL: 35.7 (ref 10–20)
CALCIUM BLD-MCNC: 10.2 MG/DL (ref 8.5–10.1)
CHLORIDE SERPL-SCNC: 105 MMOL/L (ref 98–112)
CHOLEST SERPL-MCNC: 217 MG/DL (ref ?–200)
CO2 SERPL-SCNC: 26 MMOL/L (ref 21–32)
CREAT BLD-MCNC: 0.7 MG/DL
CREAT UR-SCNC: 180 MG/DL
EST. AVERAGE GLUCOSE BLD GHB EST-MCNC: 114 MG/DL (ref 68–126)
FASTING PATIENT LIPID ANSWER: NO
FASTING STATUS PATIENT QL REPORTED: NO
GFR SERPLBLD BASED ON 1.73 SQ M-ARVRAT: 87 ML/MIN/1.73M2 (ref 60–?)
GLOBULIN PLAS-MCNC: 3.9 G/DL (ref 2.8–4.4)
GLUCOSE BLD-MCNC: 112 MG/DL (ref 70–99)
HBA1C MFR BLD: 5.6 % (ref ?–5.7)
HDLC SERPL-MCNC: 70 MG/DL (ref 40–59)
LDLC SERPL CALC-MCNC: 135 MG/DL (ref ?–100)
MICROALBUMIN UR-MCNC: 9.71 MG/DL
MICROALBUMIN/CREAT 24H UR-RTO: 53.9 UG/MG (ref ?–30)
NONHDLC SERPL-MCNC: 147 MG/DL (ref ?–130)
OSMOLALITY SERPL CALC.SUM OF ELEC: 295 MOSM/KG (ref 275–295)
POTASSIUM SERPL-SCNC: 4.1 MMOL/L (ref 3.5–5.1)
PROT SERPL-MCNC: 7.8 G/DL (ref 6.4–8.2)
SODIUM SERPL-SCNC: 140 MMOL/L (ref 136–145)
TRIGL SERPL-MCNC: 71 MG/DL (ref 30–149)
VLDLC SERPL CALC-MCNC: 13 MG/DL (ref 0–30)

## 2023-05-04 PROCEDURE — 99214 OFFICE O/P EST MOD 30 MIN: CPT | Performed by: FAMILY MEDICINE

## 2023-05-04 PROCEDURE — 82043 UR ALBUMIN QUANTITATIVE: CPT | Performed by: FAMILY MEDICINE

## 2023-05-04 PROCEDURE — 82570 ASSAY OF URINE CREATININE: CPT | Performed by: FAMILY MEDICINE

## 2023-05-04 PROCEDURE — 83036 HEMOGLOBIN GLYCOSYLATED A1C: CPT | Performed by: FAMILY MEDICINE

## 2023-05-04 PROCEDURE — 80053 COMPREHEN METABOLIC PANEL: CPT | Performed by: FAMILY MEDICINE

## 2023-05-04 PROCEDURE — 80061 LIPID PANEL: CPT | Performed by: FAMILY MEDICINE

## 2023-05-04 NOTE — PATIENT INSTRUCTIONS
PATIENT INSTRUCTIONS    Thank you for seeing me today, it was a pleasure taking care of you. Please check out at the  and schedule a follow up appointment. Return in about 6 months (around 11/4/2023) for medicare visit. Continue to see Dr Melinda Ball on a regular basis  See eye doctor Dr Jerri Chavis   Please have the doctor fax his/her note and examination to our office at [phone].   When you move, can send over records of things you need    Best,  Dr. Josefina Vasquez

## 2023-05-04 NOTE — ASSESSMENT & PLAN NOTE
Blood pressure slightly elevated in the office today. Continue amlodipine 5 mg daily for now  Consider Losartan give microalbuminuria moving forward.

## 2023-05-04 NOTE — ASSESSMENT & PLAN NOTE
Diabetes well controlled at this time. Not on any medications. Advise routine eye examination. Check labs.

## 2023-06-06 ENCOUNTER — TELEPHONE (OUTPATIENT)
Dept: INTERNAL MEDICINE CLINIC | Facility: CLINIC | Age: 78
End: 2023-06-06

## 2023-06-06 DIAGNOSIS — Z00.00 HEALTH MAINTENANCE EXAMINATION: Primary | ICD-10-CM

## 2023-06-06 NOTE — TELEPHONE ENCOUNTER
Spoke to son, Iris Seymour. Patient needs a TB test as soon as possible prior to moving into assisted living facility on 06/15/2023. Please place order for TB test to be completed somewhere within the [facility] system.     Please call son when order is ready:    [phone]    KG

## 2023-06-06 NOTE — TELEPHONE ENCOUNTER
Can you notify patient and/or Sherron Bo that Brunetta Fossa is ordered?  Thank you, Tanna Velazquez

## 2023-06-06 NOTE — TELEPHONE ENCOUNTER
Erin Stanley called from [address] [facility]     Patient is expected to move in 06/15/23     Will need a blood draw for TB before moving in     Erin Stanley # [phone]  Fax: [phone]     Will also fax over paperwork

## 2023-06-07 ENCOUNTER — LAB ENCOUNTER (OUTPATIENT)
Dept: LAB | Age: 78
End: 2023-06-07
Attending: FAMILY MEDICINE
Payer: MEDICARE

## 2023-06-07 DIAGNOSIS — Z00.00 HEALTH MAINTENANCE EXAMINATION: ICD-10-CM

## 2023-06-07 PROCEDURE — 86480 TB TEST CELL IMMUN MEASURE: CPT

## 2023-06-07 PROCEDURE — 36415 COLL VENOUS BLD VENIPUNCTURE: CPT

## 2023-06-09 LAB
M TB IFN-G CD4+ T-CELLS BLD-ACNC: 0.14 IU/ML
M TB TUBERC IFN-G BLD QL: NEGATIVE
M TB TUBERC IGNF/MITOGEN IGNF CONTROL: >10 IU/ML
QFT TB1 AG MINUS NIL: 0 IU/ML
QFT TB2 AG MINUS NIL: -0.02 IU/ML

## 2023-11-06 PROBLEM — H35.9 RETINAL DEGENERATION: Status: ACTIVE | Noted: 2023-11-06

## 2023-11-07 ENCOUNTER — MED REC SCAN ONLY (OUTPATIENT)
Dept: INTERNAL MEDICINE CLINIC | Facility: CLINIC | Age: 78
End: 2023-11-07

## 2023-11-09 ENCOUNTER — OFFICE VISIT (OUTPATIENT)
Dept: INTERNAL MEDICINE CLINIC | Facility: CLINIC | Age: 78
End: 2023-11-09
Payer: MEDICARE

## 2023-11-09 VITALS
SYSTOLIC BLOOD PRESSURE: 146 MMHG | HEIGHT: 59 IN | WEIGHT: 119 LBS | OXYGEN SATURATION: 98 % | DIASTOLIC BLOOD PRESSURE: 78 MMHG | BODY MASS INDEX: 23.99 KG/M2 | TEMPERATURE: 97 F | HEART RATE: 84 BPM

## 2023-11-09 DIAGNOSIS — E78.00 PURE HYPERCHOLESTEROLEMIA: ICD-10-CM

## 2023-11-09 DIAGNOSIS — H35.9 RETINAL DEGENERATION: ICD-10-CM

## 2023-11-09 DIAGNOSIS — Z00.00 HEALTH MAINTENANCE EXAMINATION: Primary | ICD-10-CM

## 2023-11-09 DIAGNOSIS — M85.80 OSTEOPENIA, UNSPECIFIED LOCATION: ICD-10-CM

## 2023-11-09 DIAGNOSIS — E11.51 TYPE 2 DIABETES MELLITUS WITH DIABETIC PERIPHERAL ANGIOPATHY WITHOUT GANGRENE, WITHOUT LONG-TERM CURRENT USE OF INSULIN (HCC): ICD-10-CM

## 2023-11-09 DIAGNOSIS — I44.1 SECOND DEGREE AV BLOCK: ICD-10-CM

## 2023-11-09 DIAGNOSIS — I65.29 STENOSIS OF CAROTID ARTERY, UNSPECIFIED LATERALITY: ICD-10-CM

## 2023-11-09 DIAGNOSIS — I10 HYPERTENSION, UNSPECIFIED TYPE: ICD-10-CM

## 2023-11-09 DIAGNOSIS — E04.2 MULTINODULAR THYROID: ICD-10-CM

## 2023-11-09 DIAGNOSIS — I25.10 CORONARY ARTERY DISEASE, UNSPECIFIED VESSEL OR LESION TYPE, UNSPECIFIED WHETHER ANGINA PRESENT, UNSPECIFIED WHETHER NATIVE OR TRANSPLANTED HEART: ICD-10-CM

## 2023-11-09 DIAGNOSIS — K21.9 GASTROESOPHAGEAL REFLUX DISEASE, UNSPECIFIED WHETHER ESOPHAGITIS PRESENT: ICD-10-CM

## 2023-11-09 PROBLEM — R60.0 LOWER EXTREMITY EDEMA: Status: ACTIVE | Noted: 2023-11-09

## 2023-11-09 PROBLEM — R63.6 UNDERWEIGHT: Status: RESOLVED | Noted: 2021-10-20 | Resolved: 2023-11-09

## 2023-11-09 LAB
ALBUMIN SERPL-MCNC: 4.1 G/DL (ref 3.2–4.8)
ALBUMIN/GLOB SERPL: 1.3 {RATIO} (ref 1–2)
ALP LIVER SERPL-CCNC: 102 U/L
ALT SERPL-CCNC: 16 U/L
ANION GAP SERPL CALC-SCNC: 7 MMOL/L (ref 0–18)
AST SERPL-CCNC: 17 U/L (ref ?–34)
BILIRUB SERPL-MCNC: 0.3 MG/DL (ref 0.2–1.1)
BUN BLD-MCNC: 26 MG/DL (ref 9–23)
BUN/CREAT SERPL: 29.2 (ref 10–20)
CALCIUM BLD-MCNC: 9.7 MG/DL (ref 8.7–10.4)
CHLORIDE SERPL-SCNC: 104 MMOL/L (ref 98–112)
CO2 SERPL-SCNC: 25 MMOL/L (ref 21–32)
CREAT BLD-MCNC: 0.89 MG/DL
EGFRCR SERPLBLD CKD-EPI 2021: 65 ML/MIN/1.73M2 (ref 60–?)
FASTING STATUS PATIENT QL REPORTED: NO
GLOBULIN PLAS-MCNC: 3.2 G/DL (ref 2.8–4.4)
GLUCOSE BLD-MCNC: 194 MG/DL (ref 70–99)
OSMOLALITY SERPL CALC.SUM OF ELEC: 292 MOSM/KG (ref 275–295)
POTASSIUM SERPL-SCNC: 4.3 MMOL/L (ref 3.5–5.1)
PROT SERPL-MCNC: 7.3 G/DL (ref 5.7–8.2)
SODIUM SERPL-SCNC: 136 MMOL/L (ref 136–145)

## 2023-11-09 PROCEDURE — 80053 COMPREHEN METABOLIC PANEL: CPT | Performed by: FAMILY MEDICINE

## 2023-11-09 PROCEDURE — G0439 PPPS, SUBSEQ VISIT: HCPCS | Performed by: FAMILY MEDICINE

## 2023-11-09 PROCEDURE — 1126F AMNT PAIN NOTED NONE PRSNT: CPT | Performed by: FAMILY MEDICINE

## 2023-11-09 PROCEDURE — 83036 HEMOGLOBIN GLYCOSYLATED A1C: CPT | Performed by: FAMILY MEDICINE

## 2023-11-09 RX ORDER — AMLODIPINE BESYLATE 5 MG/1
5 TABLET ORAL 2 TIMES DAILY
COMMUNITY
Start: 2023-11-09

## 2023-11-09 RX ORDER — FUROSEMIDE 20 MG/1
1 TABLET ORAL DAILY
COMMUNITY
Start: 2023-09-09

## 2023-11-09 NOTE — ASSESSMENT & PLAN NOTE
Blood pressure slightly elevated in the office today. I suspect that some of this is related to anxiety of the office visit. Currently on amlodipine 5 mg twice daily   She does have lower extremity edema at this time for which she is taking furosemide. I discussed with her that she can reach out to her cardiologist to consider switching off the amlodipine in case this is related to her lower extremity edema in any significant way. Consider Losartan give microalbuminuria moving forward. Can notify me of the plan, discussed that I am happy to help manage this if this is her cardiologist preference. Since she is at assisted living, can also consider adjusting the medications and having assisted living monitor her blood pressures while making changes.

## 2023-11-09 NOTE — ASSESSMENT & PLAN NOTE
Exercise and diet advised. Flu shot declined  Prevnar 20 vaccine - advised to consider, she will think about this. Shingles vaccine - advised to consider getting this done at the pharmacy. In general, prefers less aggressive and less invasive measures to take care of her health. Declines any further colonoscopies. Declines any further mammograms. Declines any further DEXA scans.

## 2023-11-09 NOTE — ASSESSMENT & PLAN NOTE
1+ pitting edema of the bilateral lower extremities.   Currently on furosemide 20 mg daily  Discussed with patient that can consider switching off amlodipine in case this is related to the swelling any sort of way-they will discuss this with her cardiologist.  We will monitor CMP in office today

## 2023-11-09 NOTE — ASSESSMENT & PLAN NOTE
A1c less than 7. Has been gaining significant amount of weight. Not on any medications. Advise routine eye examination. Check labs.

## 2023-11-10 LAB
EST. AVERAGE GLUCOSE BLD GHB EST-MCNC: 169 MG/DL (ref 68–126)
HBA1C MFR BLD: 7.5 % (ref ?–5.7)

## 2023-11-11 ENCOUNTER — PATIENT MESSAGE (OUTPATIENT)
Dept: INTERNAL MEDICINE CLINIC | Facility: CLINIC | Age: 78
End: 2023-11-11

## 2023-11-11 DIAGNOSIS — E11.51 TYPE 2 DIABETES MELLITUS WITH DIABETIC PERIPHERAL ANGIOPATHY WITHOUT GANGRENE, WITHOUT LONG-TERM CURRENT USE OF INSULIN (HCC): Primary | ICD-10-CM

## 2023-11-11 RX ORDER — METFORMIN HYDROCHLORIDE 500 MG/1
500 TABLET, EXTENDED RELEASE ORAL DAILY
Qty: 90 TABLET | Refills: 1 | Status: SHIPPED | OUTPATIENT
Start: 2023-11-11

## 2023-11-11 NOTE — TELEPHONE ENCOUNTER
Called and discussed with Mark Burr. Metformin started. Will await Dr Capri Lyons evaluation to discuss management of BP medications. Advised follow up in 3 months.

## 2023-12-12 ENCOUNTER — PATIENT MESSAGE (OUTPATIENT)
Dept: INTERNAL MEDICINE CLINIC | Facility: CLINIC | Age: 78
End: 2023-12-12

## 2023-12-13 NOTE — TELEPHONE ENCOUNTER
To MD for review and recommendations. LOV 11/9/23. Pt saw Dr. Yoni Mathews 10/12/23? Med h/o hypercholesterolemia, CAD, carotid stenosis.

## 2024-01-16 ENCOUNTER — TELEPHONE (OUTPATIENT)
Dept: INTERNAL MEDICINE CLINIC | Facility: CLINIC | Age: 79
End: 2024-01-16

## 2024-01-16 RX ORDER — LOSARTAN POTASSIUM 50 MG/1
50 TABLET ORAL DAILY
COMMUNITY

## 2024-01-16 NOTE — TELEPHONE ENCOUNTER
Paula from [facility] is calling in to clarify Amlodipine dosage, please call to clarify.

## 2024-01-16 NOTE — TELEPHONE ENCOUNTER
I spoke with Paula at [facility] (assisted living). Reviewed and discussed, per our records, pt should be on amlodipine 5 mg BID. Pt's son told Paula pt should be on 2.5 mg. I advised Paula to call cardiology office for clarification. She is agreeable with plan. Provided phone number for Dr. Lua's office.

## 2024-01-16 NOTE — TELEPHONE ENCOUNTER
I spoke with Shelia at [facility], reviewed med updates. He is agreeable with plan.     (Med list updated).

## 2024-01-16 NOTE — TELEPHONE ENCOUNTER
Cardiology has been adjusting mediation. Since the last time I spoke to her son, she is currently on amlodipine 2.5 mg nightly and was started on losartan 50 mg daily by cardiology. Please notify caller. Thanks! Jean

## 2024-01-17 ENCOUNTER — TELEPHONE (OUTPATIENT)
Dept: INTERNAL MEDICINE CLINIC | Facility: CLINIC | Age: 79
End: 2024-01-17

## 2024-01-18 ENCOUNTER — TELEPHONE (OUTPATIENT)
Dept: INTERNAL MEDICINE CLINIC | Facility: CLINIC | Age: 79
End: 2024-01-18

## 2024-01-18 NOTE — TELEPHONE ENCOUNTER
A  nurse at [facility] at [facility], called on behalf of patient to give an update on her health and she also would like a call back from MA or doctor. The patient has been admitted to [facility] with a broken right hip and low potassium. Please try to give her a call back when you have a moment.

## 2024-01-18 NOTE — TELEPHONE ENCOUNTER
Paged by NH RN b/c patient had mild fall backwards on the carpet in her room. Did not hit her head. No LOC. No dizziness . No pain. No injury. Just FYI.  Told RN to keep eye on her and get her evaluated if any concerns.   ER if emergent issue

## 2024-01-18 NOTE — TELEPHONE ENCOUNTER
Spoke to nurse. Nurse informed me that she had a hip fracture and admitted to [facility] at this time.

## 2024-01-24 ENCOUNTER — TELEPHONE (OUTPATIENT)
Dept: INTERNAL MEDICINE CLINIC | Facility: CLINIC | Age: 79
End: 2024-01-24

## 2024-01-24 NOTE — TELEPHONE ENCOUNTER
Called and discussed with son. Had surgery. Has plans for rehab. Not eating well though. Has plans for monitoring but can consider focusing on quality of life concerns depending on her course of recovery

## 2024-01-24 NOTE — TELEPHONE ENCOUNTER
Patient's son called the office to let Dr. Gordon know his mother fell and broke her hip.  She is at [facility].  He would like to talk to Dr. Gordon to explain her situation, he said she isn't doing to well.  And go over her medical history with him.

## 2024-03-07 ENCOUNTER — TELEPHONE (OUTPATIENT)
Dept: INTERNAL MEDICINE CLINIC | Facility: CLINIC | Age: 79
End: 2024-03-07

## 2024-03-07 NOTE — TELEPHONE ENCOUNTER
Kristen from [facility] is calling in requesting an order for hospice care. Patient has unstageable wounds in her bottom.     Please fax to [phone]

## 2024-03-08 NOTE — TELEPHONE ENCOUNTER
Please clarify - they want hospice care? Or wound care? Nursing staff - can you call and clarify needs? Happy to place orders per patient and family wishes.

## 2024-03-08 NOTE — TELEPHONE ENCOUNTER
S/w Anahi  at [facility] [phone] stated the pt sustained a hip fx while at this facility. It appears she sustained decubitus while inpatient at a rehabilitation facility. RN notified Anahi the pt has not been seen in our office since 11/9/23 so she would need a follow-up visit for evaluation and discussion of hospice care. Anahi will notify the family.

## 2024-05-23 ENCOUNTER — TELEPHONE (OUTPATIENT)
Dept: INTERNAL MEDICINE CLINIC | Facility: CLINIC | Age: 79
End: 2024-05-23

## 2024-05-23 NOTE — TELEPHONE ENCOUNTER
Dr. Gordon received a fax from [facility] requesting order review of the patient's medication orders. The last office visit has been 11/9/23 with message sent on 3/7/24 requesting an order for hospice. Prior message from this facility the patient had fallen sustained a hip fracture and decubitus in rehabilitation care.       RN unsuccessful x 2 attempts to reach [facility] with no answer or voice mail.

## 2024-05-23 NOTE — TELEPHONE ENCOUNTER
Outbound call to [facility] [phone] spoke with  who transferred RN to Roni Ward[ar RN's voice mail. A message left to call our office to discuss the care of this patient.     Inquire if the patient is in hospice? Dr. Gordon cannot verify medication since this patient has not been in our office since 11/9/23 prior to hip fracture and decubitus.

## 2024-05-28 NOTE — TELEPHONE ENCOUNTER
Spoke nurse Kyung  to [facility]  who transferred to Wellness Nurse stated the patient is now in hospice. Patient has been refusing her medications and has been hospice. She stated hospice is taking care of all the orders. RN informed the nurse if they need our office to discuss care the patient would need to be seen in our office. The patient was last seen 11/9/23. Nurse voiced understanding.

## 2024-07-10 ENCOUNTER — TELEPHONE (OUTPATIENT)
Dept: INTERNAL MEDICINE CLINIC | Facility: CLINIC | Age: 79
End: 2024-07-10

## 2024-07-10 NOTE — TELEPHONE ENCOUNTER
Roni called from [facility] in [place].    He faxed orders to Dr. Gordon on 6/20 & 6/26.    He hasn't received the orders faxed back to him.    Fax # [phone].

## 2024-07-10 NOTE — TELEPHONE ENCOUNTER
Thanks so much. We have faxed several notes back explaining that I am no longer managing her hospice medications. Thanks for directly addressing this. Jean

## 2024-07-10 NOTE — TELEPHONE ENCOUNTER
Call returned to Roni/ [facility] asking for 's orders review- stating he faxed orders to Dr Gordon several times and has not received them back as confirmed.    Offered to go through the list of meds he has ion file as current for the patient against the current med list we have for pt last seen in office 2/2024.   He reads- morphine, scopalamine,..etc. Inquiring further- this pt is now a hospice patient. Hence long discussion w/ Roni re:  any  meds ordered by Dr Gordon previously become irrelevant when a patient 's care is transferred to a hospice status bc then the Hospice Medical Director takes over and they determine what is necessary for palliative care measures as pt is no longer receiving active medical/ preventive care.    Roni verbalizes understanding and will contact patient's hospice physician. Encounter routed to Dr Gordon as alert re: pt's hospice status.